# Patient Record
(demographics unavailable — no encounter records)

---

## 2017-03-24 NOTE — EMERGENCY ROOM REPORT
History of Present Illness


General


Chief Complaint:  To Be Triaged





Present Illness


Allergies:  


Coded Allergies:  


     TETRACYCLINE (Verified  Adverse Reaction, Intermediate, 8/17/16)





Nursing Documentation-PMH


Hx Hypertension:  Yes


Hx COPD:  Yes





Medical Decision Making


Diagnostic Impression:  


 Primary Impression:  


 LWBSMD


ER Course


Patient left before triage.


Disposition:  LEFT W/OUT BEING SEEN


Condition:  Unknown











Nathan Braun M.D. Mar 24, 2017 23:55

## 2017-03-29 NOTE — CARDIOLOGY REPORT
--------------- APPROVED REPORT --------------





EKG Measurement

Heart [id number]

MI [id number]

[id number]

[id number]

[id number]





Normal sinus rhythm

Possible Left atrial enlargement

Borderline ECG

## 2017-03-29 NOTE — EMERGENCY ROOM REPORT
History of Present Illness


General


Chief Complaint:  Abdominal Pain


Source:  Patient





Present Illness


HPI


Patient is a 47-year-old female presented after having increased abdominal 

pain.  Patient reports having had generalized abdominal pain which did not 

radiate.  Patient reported having some epigastric pain.  Patient had reported 

some hematemesis with blood-streaked emesis.  This had subsequently resolved.  

The pain is described as a burning sensation.  The patient had no black or 

bloody stools.


Allergies:  


Coded Allergies:  


     TETRACYCLINE (Verified  Adverse Reaction, Intermediate, 8/17/16)





Patient History


Past Medical History:  see triage record


Last Menstrual Period:  1 year ago


Pregnant Now:  No - yara menopause


Reviewed Nursing Documentation:  PMH: Agreed, PSxH: Agreed





Nursing Documentation-PMH


Past Medical History:  No Stated History


Hx Hypertension:  Yes


Hx COPD:  Yes





Review of Systems


All Other Systems:  negative except mentioned in HPI





Physical Exam





Vital Signs








  Date Time  Temp Pulse Resp B/P Pulse Ox O2 Delivery O2 Flow Rate FiO2


 


3/28/17 11:20 98.4 91 16 120/74 95 Room Air  








Sp02 EP Interpretation:  reviewed, normal


General Appearance:  normal inspection, well appearing, no apparent distress, 

alert, GCS 15


Head:  atraumatic


ENT:  normal ENT inspection, hearing grossly normal, normal voice


Neck:  normal inspection, full range of motion, supple, no bony tend


Respiratory:  normal inspection, lungs clear, normal breath sounds, no 

respiratory distress, no retraction, no wheezing


Cardiovascular #1:  regular rate, rhythm, no edema


Gastrointestinal:  normal inspection, normal bowel sounds, non tender, soft, no 

guarding, no hernia


Genitourinary:  no CVA tenderness


Musculoskeletal:  normal inspection, back normal, normal range of motion


Neurologic:  normal inspection, alert, responsive, speech normal


Psychiatric:  normal inspection, judgement/insight normal, mood/affect normal


Skin:  normal inspection, normal color, no rash





Medical Decision Making


Diagnostic Impression:  


 Primary Impression:  


 Gastritis


 Additional Impression:  


 UTI (urinary tract infection)


ER Course


Patient presented for abdominal pain. Differential diagnoses included ischemic 

bowel, appendicitis, perforated viscus, abdominal aortic aneurysm, inferior 

myocardial infarction, viral gastroenteritis


Because of complexity of patient's case laboratory testing and imaging studies 

were ordered.  The patient was given IV acid blocker


The patient is advised to follow up with  primary care doctor in 1-2 days.The 

patient was noted to have adequate hemoglobin.  Patient had the evidence of 

urinary tract infection.  The patient was given prescription for oral 

antibiotics.  The patient is advised followup with gastroenterology for 

outpatient workup of hematemesis  Patient is advised to return if any worsening 

condition or if any changes in status that are concerning.





Labs








Test


  3/28/17


11:40 3/28/17


11:55


 


Urine Color Yellow  


 


Urine Appearance Cloudy  


 


Urine pH 5 (4.5-8.0)  


 


Urine Specific Gravity


  1.020


(1.005-1.035) 


 


 


Urine Protein


  Negative


(NEGATIVE) 


 


 


Urine Glucose (UA)


  Negative


(NEGATIVE) 


 


 


Urine Ketones 1+ (NEGATIVE)  


 


Urine Occult Blood 5+ (NEGATIVE)  


 


Urine Nitrite


  Negative


(NEGATIVE) 


 


 


Urine Bilirubin


  Negative


(NEGATIVE) 


 


 


Urine Urobilinogen


  1 MG/DL


(0.0-1.0) 


 


 


Urine Leukocyte Esterase 2+ (NEGATIVE)  


 


Urine RBC


  5-10 /HPF (0 -


2) 


 


 


Urine WBC


  5-10 /HPF (0 -


2) 


 


 


Urine Squamous Epithelial


Cells Moderate /LPF


(NONE/OCC) 


 


 


Urine Bacteria


  Few /HPF


(NONE) 


 


 


Urine HCG, Qualitative Negative  


 


Urine Opiates Screen


  Negative


(NEGATIVE) 


 


 


Urine Barbiturates Screen


  Negative


(NEGATIVE) 


 


 


Phencyclidine (PCP) Screen


  Negative


(NEGATIVE) 


 


 


Urine Amphetamines Screen


  Negative


(NEGATIVE) 


 


 


Urine Benzodiazepines Screen


  Negative


(NEGATIVE) 


 


 


Urine Cocaine Screen


  Negative


(NEGATIVE) 


 


 


Urine Marijuana (THC) Screen


  Positive


(NEGATIVE) 


 


 


White Blood Count


  


  5.1 K/UL


(4.8-10.8)


 


Red Blood Count


  


  3.84 M/UL


(4.20-5.40)


 


Hemoglobin


  


  13.9 G/DL


(12.0-16.0)


 


Hematocrit


  


  40.8 %


(37.0-47.0)


 


Mean Corpuscular Volume  106 FL (80-99) 


 


Mean Corpuscular Hemoglobin


  


  36.1 PG


(27.0-31.0)


 


Mean Corpuscular Hemoglobin


Concent 


  34.0 G/DL


(32.0-36.0)


 


Red Cell Distribution Width


  


  11.1 %


(11.6-14.8)


 


Platelet Count


  


  228 K/UL


(150-450)


 


Mean Platelet Volume


  


  7.0 FL


(6.5-10.1)


 


Neutrophils (%) (Auto)


  


  57.0 %


(45.0-75.0)


 


Lymphocytes (%) (Auto)


  


  26.1 %


(20.0-45.0)


 


Monocytes (%) (Auto)


  


  10.1 %


(1.0-10.0)


 


Eosinophils (%) (Auto)


  


  4.7 %


(0.0-3.0)


 


Basophils (%) (Auto)


  


  2.2 %


(0.0-2.0)


 


Prothrombin Time


  


  10.6 SEC


(9.30-11.50)


 


Prothromb Time International


Ratio 


  1.0 (0.9-1.1) 


 


 


Activated Partial


Thromboplast Time 


  25 SEC (23-33) 


 


 


Sodium Level


  


  140 mEQ/L


(135-145)


 


Potassium Level


  


  4.0 mEQ/L


(3.4-4.9)


 


Chloride Level


  


  100 mEQ/L


()


 


Carbon Dioxide Level


  


  27 mEQ/L


(20-30)


 


Anion Gap  13 (5-15) 


 


Blood Urea Nitrogen  6 mg/dL (7-23) 


 


Creatinine


  


  0.7 mg/dL


(0.5-0.9)


 


Estimat Glomerular Filtration


Rate 


  > 60 mL/min


(>60)


 


Glucose Level


  


  99 mg/dL


()


 


Calcium Level


  


  9.1 mg/dL


(8.6-10.2)


 


Total Bilirubin


  


  0.5 mg/dL


(0.0-1.2)


 


Aspartate Amino Transf


(AST/SGOT) 


  21 U/L (5-40) 


 


 


Alanine Aminotransferase


(ALT/SGPT) 


  19 U/L (3-33) 


 


 


Alkaline Phosphatase


  


  23 U/L


()


 


Total Protein


  


  6.7 g/dL


(6.6-8.7)


 


Albumin


  


  4.1 g/dL


(3.5-5.2)


 


Globulin  2.6 g/dL 


 


Albumin/Globulin Ratio  1.5 (1.0-2.7) 


 


Lipase  17 U/L (< 60) 











Last Vital Signs








  Date Time  Temp Pulse Resp B/P Pulse Ox O2 Delivery O2 Flow Rate FiO2


 


3/28/17 14:40 97.9 60 18 113/68 97 Room Air  








Status:  improved


Disposition:  HOME, SELF-CARE


Condition:  Stable


Scripts


Sucralfate* (CARAFATE*) 1 Gm Tablet


1 GM ORAL FOUR TIMES A DAY for 14 Days, #30 TAB


   Prov: Lev Davila         3/28/17 


Cephalexin* (KEFLEX*) 500 Mg Capsule


500 MG ORAL Q6H, #28 CAP 0 Refills


   Prov: Lev Davila         3/28/17 


Omeprazole Magnesium (PRILOSEC OTC) 20 Mg Tablet.dr


20 MG ORAL DAILY, #30 TAB


   Prov: Lev Davila         3/28/17


Patient Instructions:  Hematemesis, Abdominal Pain, Adult





Additional Instructions:  


Follow up with GI for further evaluation of bleeding











Lev Davila Mar 29, 2017 18:23

## 2017-11-22 NOTE — EMERGENCY ROOM REPORT
History of Present Illness


General


Chief Complaint:  Skin Rash/Abscess


Source:  Patient





Present Illness


HPI


Patient with rash on torso for at least a week.  Began under breasts with 

itching and redness.  No fevers.  Now spread to back.  Minimal on arms.  None 

on face.  No prior allergies, but she is concerned about this.  No URI sy.  She 

has occasionally used hydrocortisone.





Not outside with bites.  No fomites seen at home.





No diabetes.


Allergies:  


Coded Allergies:  


     TETRACYCLINE (Verified  Adverse Reaction, Intermediate, 8/17/16)





Patient History


Past Medical History:  see triage record


Social History:  Reports: smoking


Social History Narrative


at home


Last Menstrual Period:  n/a


Pregnant Now:  No


Reviewed Nursing Documentation:  PMH: Agreed, PSxH: Agreed





Nursing Documentation-PMH


Past Medical History:  No History, Except For


Hx Hypertension:  No


Hx COPD:  No





Review of Systems


All Other Systems:  negative except mentioned in HPI





Physical Exam





Vital Signs








  Date Time  Temp Pulse Resp B/P (MAP) Pulse Ox O2 Delivery O2 Flow Rate FiO2


 


11/22/17 02:01 98.2 78 16 130/81 98 Room Air  








Sp02 EP Interpretation:  reviewed, normal


General Appearance:  well appearing, no apparent distress


Head:  normocephalic, atraumatic


ENT:  hearing grossly normal, normal voice


Neck:  full range of motion, supple


Respiratory:  no respiratory distress, speaking full sentences


Musculoskeletal:  no calf tenderness


Neurologic:  alert, normal gait


Psychiatric:  mood/affect normal


Skin:  other - erythema under breasts, fine macular rash extending around trunk





Medical Decision Making


Diagnostic Impression:  


 Primary Impression:  


 Intertrigo


 Additional Impression:  


 Id reaction


ER Course


Patient with rash.  Ddx; cellulitis, intertrigo, yeast, viral exanthem, 

allergic reaction.  Exam most consistent with intertrigo though allergic 

reaction can't be excluded.  Will treat with antifungals and symptomatically.  

Not toxic and not febrile.  no labs indicated at this time.  Benadryl given.





Patient stable for outpatient observation and treatment.





Last Vital Signs








  Date Time  Temp Pulse Resp B/P (MAP) Pulse Ox O2 Delivery O2 Flow Rate FiO2


 


11/22/17 03:22 98.2 69 16 127/93 99 Room Air  








Status:  improved


Disposition:  HOME, SELF-CARE


Condition:  Improved


Scripts


Diphenhydramine Hcl* (BENADRYL*) 25 Mg Capsule


25 MG ORAL Q6H Y for Itching, #20 CAP


   Prov: Nathan Braun M.D.         11/22/17 


Clotrimazole (Lotrimin AF) 12 Gm Cream..g.


1 APPLIC TP BID, #24 GM 1 Refill


   Prov: Nathan Braun M.D.         11/22/17 


Fluconazole (FLUCONAZOLE) 100 Mg Tablet


100 MG ORAL DAILY, #7 TAB 0 Refills


   Prov: Nathan Braun M.D.         11/22/17


Referrals:  


[facility],REFERRING (PCP)











Nathan Braun M.D. Nov 22, 2017 03:08